# Patient Record
Sex: FEMALE | Race: OTHER | NOT HISPANIC OR LATINO
[De-identification: names, ages, dates, MRNs, and addresses within clinical notes are randomized per-mention and may not be internally consistent; named-entity substitution may affect disease eponyms.]

---

## 2022-10-18 PROBLEM — Z00.00 ENCOUNTER FOR PREVENTIVE HEALTH EXAMINATION: Status: ACTIVE | Noted: 2022-10-18

## 2023-01-19 ENCOUNTER — APPOINTMENT (OUTPATIENT)
Dept: ENDOCRINOLOGY | Facility: CLINIC | Age: 43
End: 2023-01-19
Payer: COMMERCIAL

## 2023-01-19 PROCEDURE — 99205 OFFICE O/P NEW HI 60 MIN: CPT

## 2023-01-19 NOTE — DISCUSSION/SUMMARY
[Low acute suicide risk] : Low acute suicide risk [FreeTextEntry1] : 41 y/o with the gender hx confirming GD dx \par at this point pt is in the process of deciding about top surgery\par as for trauma hx, suggested if pt interested may consider therapy \par low risk of si or hi\par will f/u for gender transitioning

## 2023-01-19 NOTE — PHYSICAL EXAM
[None] : none [Average] : average [Euthymic] : euthymic [Full] : full [Clear] : clear [Linear/Goal Directed] : linear/goal directed [None Reported] : none reported [WNL] : within normal limits
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
## 2023-02-17 ENCOUNTER — NON-APPOINTMENT (OUTPATIENT)
Age: 43
End: 2023-02-17

## 2023-02-17 ENCOUNTER — TRANSCRIPTION ENCOUNTER (OUTPATIENT)
Age: 43
End: 2023-02-17

## 2023-02-22 ENCOUNTER — APPOINTMENT (OUTPATIENT)
Dept: PLASTIC SURGERY | Facility: CLINIC | Age: 43
End: 2023-02-22
Payer: COMMERCIAL

## 2023-02-22 DIAGNOSIS — Z87.891 PERSONAL HISTORY OF NICOTINE DEPENDENCE: ICD-10-CM

## 2023-02-22 DIAGNOSIS — Z78.9 OTHER SPECIFIED HEALTH STATUS: ICD-10-CM

## 2023-02-22 PROCEDURE — 99204 OFFICE O/P NEW MOD 45 MIN: CPT

## 2023-03-30 ENCOUNTER — APPOINTMENT (OUTPATIENT)
Dept: ENDOCRINOLOGY | Facility: CLINIC | Age: 43
End: 2023-03-30
Payer: COMMERCIAL

## 2023-03-30 DIAGNOSIS — F64.0 TRANSSEXUALISM: ICD-10-CM

## 2023-03-30 DIAGNOSIS — F64.8 OTHER GENDER IDENTITY DISORDERS: ICD-10-CM

## 2023-03-30 PROCEDURE — 99213 OFFICE O/P EST LOW 20 MIN: CPT

## 2023-03-30 PROCEDURE — 90833 PSYTX W PT W E/M 30 MIN: CPT

## 2023-03-30 NOTE — DISCUSSION/SUMMARY
[FreeTextEntry1] : 41 y/o trans masculine person with long history of dysphoria and chest dysphoria, has developed more clear language and understanding about dysphoria over the past few years and is seeking a top surgery\par has had chest reduction in the past \par psych clear \par has GD \par f/u post surgery for support \par possible therapy for support discussed  \par session included 30 min therapy supportive on gender

## 2023-03-30 NOTE — PHYSICAL EXAM
[None] : none [Cooperative] : cooperative [Euthymic] : euthymic [Full] : full [Linear/Goal Directed] : linear/goal directed [Average] : average [WNL] : within normal limits

## 2023-04-24 ENCOUNTER — TRANSCRIPTION ENCOUNTER (OUTPATIENT)
Age: 43
End: 2023-04-24

## 2023-04-25 ENCOUNTER — TRANSCRIPTION ENCOUNTER (OUTPATIENT)
Age: 43
End: 2023-04-25

## 2023-04-26 NOTE — PHYSICAL EXAM
[de-identified] : NAD.  BMI 29.7 [de-identified] : Normal respiratory effort [de-identified] : Breast exam was performed with a medical assistant chaperone present. No dominant masses, skin changes, nipple retraction, or palpable axillary lymphadenopathy. SN->N distance is 28 cm on the right and 30 cm on the left; width is 22 cm on the right and 22 cm on the left; N->IMF is 18 cm on the right and 16 cm on the left. There is bilateral grade 2 ptosis.  There are well-healed inverted T scars.

## 2023-04-26 NOTE — HISTORY OF PRESENT ILLNESS
[FreeTextEntry1] : 42-year-old person designated female at birth (Zamorano; he/him) presents for consultation for top surgery.  He states he wants his chest to be "super flat."  He is not on testosterone.  He binds with a binder.  He denies any lumps, bumps, or other recent changes in his breasts.  He has never had a mammogram.  He had a prior breast reduction in 2009.  He denies any family history of breast cancer or blood clots.  He states that nipple sensation is not at all important to him (1 out of 5 importance).  He has no plans to ever breast or chest feed and expresses understanding that this procedure would render him unable to do so.\par \par He works as a  for [employer].  He lives with his girlfriend, whom he states would be able to assist him after surgery.  He quit smoking over 10 years ago.  He occasionally drinks alcohol.  He denies other recreational drug use.

## 2023-04-26 NOTE — ADDENDUM
[FreeTextEntry1] : 2023-04-26\par \par Mammogram from 2023-3-7 demonstrates BI-RADS 2 findings.  The patient has a letter of assessment in support of top surgery from Torsten Wharton MD, psychiatrist, we will submit for insurance authorization.

## 2023-04-26 NOTE — ASSESSMENT
[FreeTextEntry1] : The patient expresses preference for the double incision free nipple graft top surgery technique.  I do not think he would benefit significantly from liposuction.  To proceed, he will need to get a mammogram and provide us with the report.  He will also need a letter of assessment from a mental health provider in support of the procedure

## 2023-05-25 ENCOUNTER — TRANSCRIPTION ENCOUNTER (OUTPATIENT)
Age: 43
End: 2023-05-25